# Patient Record
Sex: MALE | Race: WHITE | URBAN - NONMETROPOLITAN AREA
[De-identification: names, ages, dates, MRNs, and addresses within clinical notes are randomized per-mention and may not be internally consistent; named-entity substitution may affect disease eponyms.]

---

## 2019-02-04 ENCOUNTER — IMPORTED ENCOUNTER (OUTPATIENT)
Dept: URBAN - NONMETROPOLITAN AREA CLINIC 1 | Facility: CLINIC | Age: 19
End: 2019-02-04

## 2019-02-04 PROBLEM — Z01.00: Noted: 2019-02-04

## 2019-02-04 PROCEDURE — S0620 ROUTINE OPHTHALMOLOGICAL EXA: HCPCS

## 2019-02-04 NOTE — PATIENT DISCUSSION
Clinical Emmetropia OU-  discussed findings w/patient-  no spectacle Rx issued-  continue to monitor yearly or prn; 's Notes: MR 2/4/2019DFE 2/4/2019

## 2022-04-09 ASSESSMENT — VISUAL ACUITY
OS_CC: 20/20
OD_CC: 20/20

## 2025-03-25 ENCOUNTER — COMPREHENSIVE EXAM (OUTPATIENT)
Age: 25
End: 2025-03-25

## 2025-03-25 DIAGNOSIS — H52.13: ICD-10-CM

## 2025-03-25 DIAGNOSIS — H52.223: ICD-10-CM

## 2025-03-25 PROCEDURE — 92015 DETERMINE REFRACTIVE STATE: CPT

## 2025-03-25 PROCEDURE — 92014 COMPRE OPH EXAM EST PT 1/>: CPT
